# Patient Record
Sex: FEMALE | Race: WHITE
[De-identification: names, ages, dates, MRNs, and addresses within clinical notes are randomized per-mention and may not be internally consistent; named-entity substitution may affect disease eponyms.]

---

## 2020-09-17 ENCOUNTER — HOSPITAL ENCOUNTER (EMERGENCY)
Dept: HOSPITAL 41 - JD.ED | Age: 30
LOS: 1 days | Discharge: HOME | End: 2020-09-18
Payer: MEDICAID

## 2020-09-17 DIAGNOSIS — Z3A.14: ICD-10-CM

## 2020-09-17 DIAGNOSIS — O23.592: Primary | ICD-10-CM

## 2020-09-17 DIAGNOSIS — B96.89: ICD-10-CM

## 2020-09-17 NOTE — EDM.PDOC
ED HPI GENERAL MEDICAL PROBLEM





- General


Chief Complaint: OB/GYN Problem


Stated Complaint: 16 WKS PG LEAKING CLEAR FLUID


Time Seen by Provider: 20 23:27


Source of Information: Reports: Patient


History Limitations: Reports: No Limitations





- History of Present Illness


INITIAL COMMENTS - FREE TEXT/NARRATIVE: 





The patient presents with a clear vaginal discharge.  The patient is  at 15

weeks gestation with a LNMP of what she estimates at .  She noticed some

discharge last night but thought nothing of it and then tonight when she was 

going to get intimate with her  she noticed more fluid.  It is clear and 

not bloody.  She has no pain.  She has no fever, chills, cough, congestion, 

runny nose, chest pain, shortness of breath, abdominal pain, nausea or vomiting.

 She has no dysuria or hematuria.  She has no prenatal care yet.  She was 

waiting for her insurance.


Onset: Gradual


Duration: Day(s): (Yesterday)


Improves with: Reports: None


Worsens with: Reports: None


Associated Symptoms: Reports: No Other Symptoms





- Related Data


                                    Allergies











Allergy/AdvReac Type Severity Reaction Status Date / Time


 


No Known Allergies Allergy   Verified 20 23:15











Home Meds: 


                                    Home Meds





metroNIDAZOLE [Flagyl] 500 mg PO Q12H #14 tab 20 [Rx]











Past Medical History


OB/GYN History: Reports: Pregnancy





Social & Family History





- Tobacco Use


Smoking Status *Q: Never Smoker





ED ROS GENERAL





- Review of Systems


Review Of Systems: See Below


Constitutional: Reports: No Symptoms


HEENT: Reports: No Symptoms


Respiratory: Reports: No Symptoms


Cardiovascular: Reports: No Symptoms


Endocrine: Reports: No Symptoms


GI/Abdominal: Reports: No Symptoms


: Reports: Discharge





ED EXAM PREGNANCY





- Physical Exam


Exam: See Below


Exam Limited By: No Limitations


General Appearance: Alert, No Apparent Distress


Ears: Normal External Exam


Nose: Normal Inspection


Head: Atraumatic, Normocephalic


Neck: Normal Inspection


Respiratory/Chest: No Respiratory Distress, Lungs Clear, Normal Breath Sounds


Cardiovascular: Regular Rate, Rhythm, No Edema, No Murmur


GI/Abdominal Exam: Soft, Non-Tender, No Organomegaly, No Mass


Back Exam: Normal Inspection


Extremities: Normal Inspection





Course





- Vital Signs


Last Recorded V/S: 


                                Last Vital Signs











Temp  98.4 F   20 23:10


 


Pulse  89   20 23:10


 


Resp  16   20 23:10


 


BP  125/82   20 23:10


 


Pulse Ox  100   20 23:10














- Orders/Labs/Meds


Orders: 


                               Active Orders 24 hr











 Category Date Time Status


 


 Pelvic Exam, Set Up [RC] ASDIRECTED Care  20 23:58 Active


 


 OB Ltd 1 or More Fetus [US] Stat Exams  20 23:37 Taken


 


 GC/CHLAMYDIA BY PCR [MOLEC] Routine Lab  20 01:42 Received


 


 PATIENT RETYPE [BBK] Routine Lab  20 00:35 Ordered











Labs: 


                                Laboratory Tests











  20 Range/Units





  23:47 23:47 23:47 


 


WBC  10.89 H    (3.98-10.04)  K/mm3


 


RBC  4.17    (3.98-5.22)  M/mm3


 


Hgb  12.7    (11.2-15.7)  gm/dl


 


Hct  38.8    (34.1-44.9)  %


 


MCV  93.0    (79.4-94.8)  fl


 


MCH  30.5    (25.6-32.2)  pg


 


MCHC  32.7    (32.2-35.5)  g/dl


 


RDW Std Deviation  44.1    (36.4-46.3)  fL


 


Plt Count  186    (182-369)  K/mm3


 


MPV  9.6    (9.4-12.3)  fl


 


Neut % (Auto)  72.0 H    (34.0-71.1)  %


 


Lymph % (Auto)  19.7    (19.3-51.7)  %


 


Mono % (Auto)  6.7    (4.7-12.5)  %


 


Eos % (Auto)  1.2    (0.7-5.8)  


 


Baso % (Auto)  0.1    (0.1-1.2)  %


 


Neut # (Auto)  7.84 H    (1.56-6.13)  K/mm3


 


Lymph # (Auto)  2.15    (1.18-3.74)  K/mm3


 


Mono # (Auto)  0.73 H    (0.24-0.36)  K/mm3


 


Eos # (Auto)  0.13    (0.04-0.36)  K/mm3


 


Baso # (Auto)  0.01    (0.01-0.08)  K/mm3


 


HCG, Quant   87316.0   mIU/mL


 


Fetal Membrane Rupture     


 


Blood Type    O POSITIVE  














  20 Range/Units





  01:35 


 


WBC   (3.98-10.04)  K/mm3


 


RBC   (3.98-5.22)  M/mm3


 


Hgb   (11.2-15.7)  gm/dl


 


Hct   (34.1-44.9)  %


 


MCV   (79.4-94.8)  fl


 


MCH   (25.6-32.2)  pg


 


MCHC   (32.2-35.5)  g/dl


 


RDW Std Deviation   (36.4-46.3)  fL


 


Plt Count   (182-369)  K/mm3


 


MPV   (9.4-12.3)  fl


 


Neut % (Auto)   (34.0-71.1)  %


 


Lymph % (Auto)   (19.3-51.7)  %


 


Mono % (Auto)   (4.7-12.5)  %


 


Eos % (Auto)   (0.7-5.8)  


 


Baso % (Auto)   (0.1-1.2)  %


 


Neut # (Auto)   (1.56-6.13)  K/mm3


 


Lymph # (Auto)   (1.18-3.74)  K/mm3


 


Mono # (Auto)   (0.24-0.36)  K/mm3


 


Eos # (Auto)   (0.04-0.36)  K/mm3


 


Baso # (Auto)   (0.01-0.08)  K/mm3


 


HCG, Quant   mIU/mL


 


Fetal Membrane Rupture  Negative  


 


Blood Type   














- Re-Assessments/Exams


Free Text/Narrative Re-Assessment/Exam: 





20 23:55


I have ordered labs and UA and I will do an amnisure test to check for amniotic 

fluid.


20 01:36


Her WBC was slightly elevated at 10.89.  Her Hcg is elevated at 34,281.  Her 

blood type is O positive.  The US shows single live intrauterine gestation with 

sonographic gestational age of 14 weeks 2 days.  Amniotic fluid is within normal

 limits.  FHT is 160.





I did a speculum exam and there was some clear drainage.  I did an amniotic 

fluid test and that was negative.  Her Wet prep did show some clue cells.  I 

will get her on some flagyl for that.  I will have her follow up with OB.





Departure





- Departure


Time of Disposition: 02:10


Disposition: Home, Self-Care 01


Condition: Good


Clinical Impression: 


 Bacterial vaginosis in pregnancy





Pregnancy


Qualifiers:


 Weeks of gestation: 14 weeks Qualified Code(s): Z3A.14 - 14 weeks gestation of 

pregnancy








- Discharge Information


*PRESCRIPTION DRUG MONITORING PROGRAM REVIEWED*: Not Applicable


*COPY OF PRESCRIPTION DRUG MONITORING REPORT IN PATIENT [place]: Not Applicable


Prescriptions: 


metroNIDAZOLE [Flagyl] 500 mg PO Q12H #14 tab


Referrals: 


PCP,None [Primary Care Provider] - 


Forms:  ED Department Discharge


Additional Instructions: 


Take the flagyl 2 times per day for 7 days.  Follow up with your OB doctor as 

scheduled.  Please return if you are worse.





Sepsis Event Note (ED)





- Evaluation


Sepsis Screening Result: No Definite Risk





- Focused Exam


Vital Signs: 


                                   Vital Signs











  Temp Pulse Resp BP Pulse Ox


 


 20 23:10  98.4 F  89  16  125/82  100














- My Orders


Last 24 Hours: 


My Active Orders





20 23:37


OB Ltd 1 or More Fetus [US] Stat 





20 23:58


Pelvic Exam, Set Up [RC] ASDIRECTED 





20 00:35


PATIENT RETYPE [BBK] Routine 





20 01:42


GC/CHLAMYDIA BY PCR [MOLEC] Routine 














- Assessment/Plan


Last 24 Hours: 


My Active Orders





20 23:37


OB Ltd 1 or More Fetus [US] Stat 





20 23:58


Pelvic Exam, Set Up [RC] ASDIRECTED 





20 00:35


PATIENT RETYPE [BBK] Routine 





20 01:42


GC/CHLAMYDIA BY PCR [MOLEC] Routine

## 2020-09-18 LAB
C TRACH DNA SPEC QL NAA+PROBE: NOT DETECTED
N GONORRHOEA DNA SPEC QL NAA+PROBE: NOT DETECTED

## 2020-09-18 NOTE — US
Obstetrical ultrasound: Multiple real-time images were obtained 

transabdominally.

 

Comparison: No previous obstetrical imaging is available.

 

Dates:

Current ultrasound: [facility] 03/17/21, gestational age 14 weeks 2 days

 

Fetal presentation: Cephalic

Placenta: Posterior with no findings of placenta previa, no findings 

of abruption are seen.

Amniotic fluid: CHAPO 9.1 cm

 

Cervix is closed with length of 4.2 cm.

 

Measurements:

BPD: 2.43 cm - 14 weeks 1 day

Head circumference: 9.81 cm - 14 weeks 4 days

Abdominal circumference: 7.57 cm - 14 weeks 1 day

Femur length: 1.39 cm - 14 weeks 1 day

Estimated fetal weight: 90 g (0 lbs. 3 oz.), estimated fetal weight 

24th percentile for age by current ultrasound

Heart rate: 155 bpm

 

Impression:

1.  Single intrauterine fetus currently cephalic in presentation.  

Dates as noted above.

2.  Normal CHAPO is noted.  No complicating process is seen by 

ultrasound exam.

 

Diagnostic code #1

 

This report was dictated in MDT

 

I agree with preliminary report from jose roberto, finalized on 09/18/20, 2:18

 AM Central Daylight Time

## 2021-01-31 ENCOUNTER — HOSPITAL ENCOUNTER (EMERGENCY)
Dept: HOSPITAL 41 - JD.ED | Age: 31
Discharge: HOME | End: 2021-01-31
Payer: MEDICAID

## 2021-01-31 DIAGNOSIS — Z3A.34: ICD-10-CM

## 2021-01-31 DIAGNOSIS — J01.90: ICD-10-CM

## 2021-01-31 DIAGNOSIS — Z20.822: ICD-10-CM

## 2021-01-31 DIAGNOSIS — O99.513: Primary | ICD-10-CM

## 2021-01-31 PROCEDURE — 99283 EMERGENCY DEPT VISIT LOW MDM: CPT

## 2021-01-31 PROCEDURE — 87635 SARS-COV-2 COVID-19 AMP PRB: CPT

## 2021-01-31 PROCEDURE — U0002 COVID-19 LAB TEST NON-CDC: HCPCS

## 2021-01-31 NOTE — EDM.PDOC
ED HPI GENERAL MEDICAL PROBLEM





- General


Chief Complaint: Respiratory Problem


Stated Complaint: CONGESTION/SORE THROAT/COUGH


Time Seen by Provider: 21 15:47


Source of Information: Reports: Patient, Family (spouse)


History Limitations: Reports: No Limitations





- History of Present Illness


INITIAL COMMENTS - FREE TEXT/NARRATIVE: 


30-year-old female presents to the ED for evaluation of persistent sinus 

congestion sore throat and paroxysmal intermittent cough productive of 

occasional yellow sputum.  Of note she is 34 weeks gestation.  She states nasal 

congestion symptoms have been present for the last 2 weeks and she thought were 

getting better a few days ago but it is returned with a vengeance over the last 

36 hours.  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  

She is tested either biweekly or weekly now for COVID-19 illness with the last 

test being done  and was negative.  Concern today is for COVID-19 

illness and ability to return to work.  She does not believe she is running a 

fever.  Decreased appetite however no change in smell or taste.  No diarrhea.





Onset: Gradual


Onset Date: 01/15/21


Duration: Day(s):, Constant, Getting Worse


Location: Reports: Face (Nasal congestion sinus congestion), Chest ( postnasal 

drip with paroxysmal cough)


Quality: Reports: Pressure, Other


Severity: Moderate


Improves with: Reports: None


Worsens with: Reports: None


Context: Denies: Activity, Exercise, Lifting, Sick Contact, Trauma, Other


Associated Symptoms: Reports: Cough, cough w sputum, Loss of Appetite, Malaise 

(Yellow sputum.).  Denies: Confusion, Chest Pain, Diaphoresis, Fever/Chills, 

Headaches, Nausea/Vomiting, Rash, Seizure, Shortness of Breath, Syncope, 

Weakness


Treatments PTA: Reports: Acetaminophen


  ** Throat


Pain Score (Numeric/FACES): 10





- Related Data


                                    Allergies











Allergy/AdvReac Type Severity Reaction Status Date / Time


 


No Known Allergies Allergy   Verified 20 23:15











Home Meds: 


                                    Home Meds





Cefdinir [Omnicef] 300 mg PO BID #18 cap 21 [Rx]


Pnv No.95/Ferrous Fum/Folic AC [Prenatal Multivitamin Tablet] 1 tab PO DAILY 

21 [History]











Past Medical History


OB/GYN History: Reports: Pregnancy


: 2


Para: 1





- Past Surgical History


Female  Surgical History: Reports:  Section





Social & Family History





- Tobacco Use


Tobacco Use Status *Q: Never Tobacco User





- Caffeine Use


Caffeine Use: Reports: Coffee





- Recreational Drug Use


Recreational Drug Use: No





- Living Situation & Occupation


Living situation: Reports: 


Occupation: Employed





ED ROS GENERAL





- Review of Systems


Review Of Systems: See Below


Constitutional: Reports: Malaise, Weakness, Fatigue, Decreased Appetite.  

Denies: Fever, Chills, Weight Loss


HEENT: Reports: Sinus Problem, Throat Pain (The last 2 weeks significant sinus 

congestion with postnasal drip)


Respiratory: Reports: Shortness of Breath, Cough, Sputum (Yellow sputum).  

Denies: Wheezing, Pleuritic Chest Pain


Cardiovascular: Reports: No Symptoms


Endocrine: Reports: Fatigue


GI/Abdominal: Reports: Decreased Appetite.  Denies: Constipation, Diarrhea


: Reports: No Symptoms


Musculoskeletal: Reports: No Symptoms


Skin: Reports: No Symptoms


Neurological: Reports: No Symptoms


Psychiatric: Reports: No Symptoms


Hematologic/Lymphatic: Reports: No Symptoms


Immunologic: Reports: No Symptoms





ED EXAM, GENERAL





- Physical Exam


Exam: See Below


Exam Limited By: No Limitations


General Appearance: Alert, WD/WN, No Apparent Distress, Other (Temperature is 

37.4 degrees with a heart rate of 99 in sinus respiratory to 20 with O2 sats of 

97% room air BP 1984.)


Eye Exam: Bilateral Eye: Normal Inspection (No scleral icterus or blepharal 

pallor.), PERRL


Ears: Normal TMs


Nose: Nasal Swelling (Significant swelling of both the superior and mid 

turbinates bilaterally with slight yellow-greenish discharge).  No: Clear 

Rhinorrhea


Throat/Mouth: Normal Lips, Inflammation (Diffuse inflammation of the posterior 

oropharynx)


Head: Atraumatic, Normocephalic


Neck: Normal Inspection, Supple, Non-Tender, Full Range of Motion.  No: 

Lymphadenopathy (L), Lymphadenopathy (R)


Respiratory/Chest: No Respiratory Distress, Lungs Clear, Normal Breath Sounds, 

No Accessory Muscle Use


Cardiovascular: Normal Peripheral Pulses, Regular Rate, Rhythm, No Edema, No 

Gallop, No Murmur, No Rub


Peripheral Pulses: 3+: Carotid (L), Carotid (R), Posterior Tibial (L), Posterior

 Tibial (R), Dorsalis Pedis (L), Dorsalis Pedis (R)


GI/Abdominal: Normal Bowel Sounds, Soft, Non-Tender, No Organomegaly, Pelvis 

Stable, Other (Correlates with 34 weeks gestation)


Back Exam: Normal Inspection, Full Range of Motion.  No: CVA Tenderness (L), CVA

 Tenderness (R)


Extremities: Normal Inspection, Normal Range of Motion, Non-Tender, No Pedal 

Edema


Neurological: Alert, Oriented, CN II-XII Intact, Normal Cognition


Psychiatric: Normal Affect, Normal Mood


Skin Exam: Warm, Dry, Intact, Normal Color, No Rash





Course





- Vital Signs


Last Recorded V/S: 


                                Last Vital Signs











Temp  37.4 C   21 15:20


 


Pulse  99   21 15:20


 


Resp  20   21 15:20


 


BP  125/84   21 15:20


 


Pulse Ox  97   21 15:20














- Orders/Labs/Meds


Labs: 


                                Laboratory Tests











  21 Range/Units





  16:05 


 


SARS-CoV-2 RNA (SABRINA)  Negative  (NEGATIVE)  











Meds: 


Medications














Discontinued Medications














Generic Name Dose Route Start Last Admin





  Trade Name Freq  PRN Reason Stop Dose Admin


 


Cefdinir  300 mg  21 16:00  21 16:14





  Omnicef  PO  21 16:01  300 mg





  ONETIME ONE   Administration














- Radiology Interpretation


Free Text/Narrative:: 


30-year-old female attends the ED due to persistent sinus congestion with 

postnasal drip and paroxysmal cough productive of slight yellowish sputum.  She 

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  She is Covid tested usually 

once weekly with the last test being - .  Concern is that she felt 

better for a few days and then symptoms came back again worse than ever.  She 

finds herself coughing a good portion of the night.  Of note patient is 34 weeks

 pregnant  2 para 1.  Pressure under her eyes with nasal congestion and 

sore throat.  Examination reveals no serous otitis media.  Nasal congestion 

apparent with enlargement of the turbinates both sides marked pharyngeal 

erythema without exudate tenderness throughout the submandibular glands 

bilaterally slightly worse on the left as compared to the right.  No anterior 

posterior chain adenopathy.  Lungs were clear to auscultation.  Without any 

wheezing.  Plan COVID-19 screen will be repeated since it is important that she 

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  I suspect she 

has straightforward sinusitis with postnasal drip causing cough.  Plan will be 

to place her on Omnicef 300 mg twice daily for the next 9 days.  First tablet 

was provided through the ED since the drugstores are closed at this time.  

Patient is using Mucinex every 12 hours as well which she may continue.  Note 

given to excuse her from the workplace today.








- Re-Assessments/Exams


Free Text/Narrative Re-Assessment/Exam: 





21 18:10 I did phone the patient and advised her that her COVID-19 screen 

is negative.  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

 was important for her to know this so as not to infect any of the coworkers or 

inpatients.











Departure





- Departure


Time of Disposition: 16:36


Disposition: Home, Self-Care 01


Condition: Fair


Clinical Impression: 


 Third trimester pregnancy





Sinusitis


Qualifiers:


 Sinusitis location: unspecified location Chronicity: acute Recurrence: non-re

current Qualified Code(s): J01.90 - Acute sinusitis, unspecified








- Discharge Information


*PRESCRIPTION DRUG MONITORING PROGRAM REVIEWED*: Not Applicable


*COPY OF PRESCRIPTION DRUG MONITORING REPORT IN PATIENT [place]: Not Applicable


Prescriptions: 


Cefdinir [Omnicef] 300 mg PO BID #18 cap


Instructions:  Sinusitis, Adult, Easy-to-Read


Referrals: 


PCP,None [Primary Care Provider] - 


Forms:  ED Department Discharge, ED Return to Work/School Form


Additional Instructions: 


Evaluation in the emergency room today in regards to persistent nasal congestion

sore throat and paroxysmal cough.  Symptoms have been present for over 2 weeks. 

Examination reveals significant erythema of the throat with normal tonsils.  

Mildly enlarged tender lymph nodes under the mandible.  Clear lung fields with 

no wheezing.  History suggest bronchitis and sinusitis.  Likely postnasal drip 

from sinuses causing the paroxysmal cough.  Due to pregnancy and x-ray is 

contraindicated.  Suggest treatment to be continued use of Mucinex as needed.  

Antibiotic to be Omnicef 300 mg twice daily for the next 9 days.  First tablets 

were provided through the emergency room today since the drugstores are closed 

at this time.  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

and I will call you later with the results once they become available usually 

about an hour and 1/2 to 2 hours.





Sepsis Event Note (ED)





- Evaluation


Sepsis Screening Result: No Definite Risk

## 2021-03-09 ENCOUNTER — HOSPITAL ENCOUNTER (INPATIENT)
Dept: HOSPITAL 41 - JD.OB | Age: 31
LOS: 2 days | Discharge: HOME | End: 2021-03-11
Attending: OBSTETRICS & GYNECOLOGY | Admitting: OBSTETRICS & GYNECOLOGY
Payer: MEDICAID

## 2021-03-09 DIAGNOSIS — Z20.822: ICD-10-CM

## 2021-03-09 DIAGNOSIS — Z3A.39: ICD-10-CM

## 2021-03-09 DIAGNOSIS — O34.211: Primary | ICD-10-CM

## 2021-03-09 DIAGNOSIS — Z23: ICD-10-CM

## 2021-03-09 PROCEDURE — G0008 ADMIN INFLUENZA VIRUS VAC: HCPCS

## 2021-03-09 PROCEDURE — U0002 COVID-19 LAB TEST NON-CDC: HCPCS

## 2021-03-09 RX ADMIN — KETOROLAC TROMETHAMINE SCH MG: 30 INJECTION, SOLUTION INTRAMUSCULAR at 21:16

## 2021-03-09 RX ADMIN — KETOROLAC TROMETHAMINE SCH MG: 30 INJECTION, SOLUTION INTRAMUSCULAR at 15:43

## 2021-03-09 NOTE — PCM.POSTAN
POST ANESTHESIA ASSESSMENT





- MENTAL STATUS


Mental Status: Alert, Oriented





- VITAL SIGNS


Vital Signs: 


                                Last Vital Signs











Temp  97.5 F   03/09/21 08:32


 


Pulse  74   03/09/21 08:32


 


Resp  9 L  03/09/21 08:32


 


BP  83/47 L  03/09/21 08:32


 


Pulse Ox  96   03/09/21 08:32














- RESPIRATORY


Respiratory Status: Respiratory Rate WNL, Airway Patent, O2 Saturation Stable





- CARDIOVASCULAR


CV Status: Pulse Rate WNL, Low Blood Pressure





- GASTROINTESTINAL


GI Status: No Symptoms





- PAIN


Pain Score: 0 (post SAB)





- POST OP HYDRATION


Hydration Status: Adequate & Stable

## 2021-03-09 NOTE — PCM.PREANE
Preanesthetic Assessment





- Procedure


Proposed Procedure: 


Repeat  section








- Anesthesia/Transfusion/Family Hx


Anesthesia History: Prior Anesthesia Without Reaction


Transfusion History: No Prior Transfusion(s)





- Review of Systems


General: No Symptoms


Pulmonary: Cough, Other (nasal drainage)


Cardiovascular: No Symptoms


Gastrointestinal: No Symptoms


Neurological: No Symptoms


Other: Reports: None





- Physical Assessment


NPO Status Date: 21


NPO Status Time: 22:00


Vital Signs: 





                                Last Vital Signs











Temp  97.6 F   21 06:35


 


Pulse  82   21 06:35


 


Resp  14   21 06:35


 


BP  120/91 H  21 06:35


 


Pulse Ox  96   21 06:35











Height: 1.57 m


Weight: 101.605 kg


ASA Class: 2


Mental Status: Alert & Oriented x3


Airway Class: Mallampati = 2


Dentition: Reports: Normal Dentition


Thyro-Mental Finger Breadths: 3


Mouth Opening Finger Breadths: 3


ROM/Head Extension: Full


Lungs: Clear to Auscultation, Normal Respiratory Effort


Cardiovascular: Regular Rate, Regular Rhythm





- Lab


Values: 





                             Laboratory Last Values











WBC  11.86 K/mm3 (3.98-10.04)  H  21  05:48    


 


RBC  4.17 M/mm3 (3.98-5.22)   21  05:48    


 


Hgb  11.7 gm/dl (11.2-15.7)  D 21  05:48    


 


Hct  37.5 % (34.1-44.9)   21  05:48    


 


MCV  89.9 fl (79.4-94.8)  D 21  05:48    


 


MCH  28.1 pg (25.6-32.2)   21  05:48    


 


MCHC  31.2 g/dl (32.2-35.5)  L  21  05:48    


 


RDW Std Deviation  49.0 fL (36.4-46.3)  H  21  05:48    


 


Plt Count  216 K/mm3 (182-369)   21  05:48    


 


MPV  11.0 fl (9.4-12.3)   21  05:48    


 


Neut % (Auto)  69.4 % (34.0-71.1)   21  05:48    


 


Lymph % (Auto)  19.9 % (19.3-51.7)   21  05:48    


 


Mono % (Auto)  8.8 % (4.7-12.5)   21  05:48    


 


Eos % (Auto)  1.3  (0.7-5.8)   21  05:48    


 


Baso % (Auto)  0.3 % (0.1-1.2)   21  05:48    


 


Neut # (Auto)  8.25 K/mm3 (1.56-6.13)  H  21  05:48    


 


Lymph # (Auto)  2.36 K/mm3 (1.18-3.74)   21  05:48    


 


Mono # (Auto)  1.04 K/mm3 (0.24-0.36)  H  21  05:48    


 


Eos # (Auto)  0.15 K/mm3 (0.04-0.36)   21  05:48    


 


Baso # (Auto)  0.03 K/mm3 (0.01-0.08)   21  05:48    


 


Urine Opiates Screen  Negative  (CHOQQR=773)   21  05:25    


 


Ur Buprenorphine Scrn  Negative  (CUTOFF=10)   21  05:25    


 


Ur Oxycodone Screen  Negative  (RFS2EA=010)   21  05:25    


 


Urine Methadone Screen  Negative  (RIUIDA=902)   21  05:25    


 


Ur Propoxyphene Screen  Negative  (VZMJHL=676)   21  05:25    


 


Ur Barbiturates Screen  Negative  (OXDBOT=331)   21  05:25    


 


Ur Tricyclics Screen  Negative  (IDIQWT=472)   21  05:25    


 


Ur Phencyclidine Scrn  Negative  (CUTOFF=25)   21  05:25    


 


Ur Amphetamine Screen  Negative  (AWOPZJ=569)   21  05:25    


 


U Methamphetamines Scrn  Negative  (ZPYROU=077)   21  05:25    


 


U Benzodiazepines Scrn  Negative  (LCMJFL=538)   21  05:25    


 


U Cocaine Metab Screen  Negative  (TVGMEO=767)   21  05:25    


 


U Marijuana (THC) Screen  Negative  (CUTOFF=50)   21  05:25    


 


SARS-CoV-2 RNA (SABRINA)  Negative  (NEGATIVE)   21  05:25    














- Allergies


Allergies/Adverse Reactions: 


                                    Allergies











Allergy/AdvReac Type Severity Reaction Status Date / Time


 


No Known Allergies Allergy   Verified 20 23:15














- Acknowledgements


Anesthesia Type Planned: Spinal


Pt an Appropriate Candidate for the Planned Anesthesia: Yes


Alternatives and Risks of Anesthesia Discussed w Pt/Guardian: Yes


Pt/Guardian Understands and Agrees with Anesthesia Plan: Yes





PreAnesthesia Questionnaire





- Past Health History


Medical/Surgical History: Denies Medical/Surgical History


Gastrointestinal History: Reports: GERD


OB/GYN History: Reports: Pregnancy


Psychiatric History: Reports: Depression, Other (See Below)


Other Psychiatric History: PPD


Endocrine/Metabolic History: Reports: Diabetes, Gestational





- Past Surgical History


Female  Surgical History: Reports:  Section


Musculoskeletal Surgical History: Reports: Other (See Below) (cyst removal from 

leg)





- SUBSTANCE USE


Tobacco Use Status *Q: Never Tobacco User


Second Hand Smoke Exposure: No


Recreational Drug Use History: Yes


Recreational Drug Type: Reports: Marijuana/Hashish





- HOME MEDS


Home Medications: 


                                    Home Meds





Cefdinir [Omnicef] 300 mg PO BID #18 cap 21 [Rx]


Pnv No.95/Ferrous Fum/Folic AC [Prenatal Multivitamin Tablet] 1 tab PO DAILY 

21 [History]











- CURRENT (IN HOUSE) MEDS


Current Meds: 





                               Current Medications





Citric Acid/Sodium Citrate (Bicitra Solution)  30 ml PO ONETIME ONE


   Stop: 21 06:31


Cefazolin Sodium/Dextrose 2 gm (/ Premix)  50 mls @ 100 mls/hr IV ONETIME ONE


   Stop: 21 07:29


Lactated Ringer's (Ringers, Lactated)  1,000 mls @ 125 mls/hr IV ASDIRECTED PANCHO


Metoclopramide HCl (Reglan)  10 mg IVPUSH ONETIME ONE


   Stop: 21 06:31


Sodium Chloride (Saline Flush)  10 ml FLUSH ASDIRECTED PRN


   PRN Reason: Keep Vein Open

## 2021-03-09 NOTE — PCM.OPNOTE
- General Post-Op/Procedure Note


Date of Surgery/Procedure: 21


Operative Procedure(s): repeat  section


Findings: 





viable female, weight 7#12 oz, 8/9 APGARS at 0801. 


Pre Op Diagnosis: prior , desires repeat


Post-Op Diagnosis: Same


Anesthesia Technique: Spinal


Primary Surgeon: Allyson Romero


Anesthesia Provider: Emerson Palmer


Assistant: Claudia George


Assistant: Mary Lou Ho


Fluid Replacement, Intraop: 1,400


Output, Urine Amount: 100


EBL in mLs: 800


Complications: None


Condition: Good


Free Text/Narrative:: 


The patient was taken to the operating room where spinal anesthesia was dosed to

surgical levels without difficulty. The patient was prepped and draped in the 

usual sterile fashion in the dorsal supine position with a leftward tilt. A 

Pfannenstiel skin incision was made with the scalpel and carried through to the 

underlying layer of fascia. The fascia was incised in the midline and extended 

laterally using Shabazz scissors. Kocher clamps were used to elevate the superior 

aspect of the fascial incision, which was elevated, and the underlying rectus 

muscles were dissected off bluntly and using Shabazz scissors. Attention was then 

turned to the inferior aspect of the fascial incision, which in similar fashion 

was grasped with Kocher clamps, elevated, and the underlying rectus muscles were

dissected off bluntly and using the shabazz. The rectus muscles were dissected in 

the midline.





The peritoneum was entered bluntly; this incision was extended superiorly and 

inferiorly with good visualization of the bladder. The bladder blade was 

inserted. The vesicouterine peritoneum was identified and entered sharply using 

Metzenbaum scissors. This incision was extended laterally and the bladder flap 

was created digitally. The bladder blade was reinserted. The lower uterine 

segment was incised in a transverse fashion using the scalpel and with digital 

traction.





Clear fluid was noted. The infant was subsequently delivered by flexing the head

to the incision. Body and shoulders followed without difficulty.  The cord was 

clamped and cut. The infant was subsequently handed to the awaiting pediatrician

whose presence had been requested.. The placenta was delivered spontaneously 

intact with a three-vessel cord noted. The uterus was exteriorized and cleared 

of all clots and debris. The uterine incision was repaired in 1 layers using 0 

monocryl. Hemostasis was visualized.  Hemostasis was visualized bilaterally. The

uterus was returned to the abdomen. One small area of bleeding was noted and a 

figure of eight utilized to render that hemostatic. The uterine incision was 

reexamined and it was noted to be hemostatic. The pelvis was copiously 

irrigated. The fascia was closed with 1 PDS suture,  and the skin was closed 

with 3-0 monocryl. Sponge, lap, and instrument counts were correct x2. The 

patient was stable at the completion of the procedure and was subsequently 

transferred to the recovery room in stable condition.

## 2021-03-10 PROCEDURE — 3E02340 INTRODUCTION OF INFLUENZA VACCINE INTO MUSCLE, PERCUTANEOUS APPROACH: ICD-10-PCS | Performed by: OBSTETRICS & GYNECOLOGY

## 2021-03-10 RX ADMIN — OXYCODONE HYDROCHLORIDE AND ACETAMINOPHEN PRN TAB: 5; 325 TABLET ORAL at 20:11

## 2021-03-10 RX ADMIN — OXYCODONE HYDROCHLORIDE AND ACETAMINOPHEN PRN TAB: 5; 325 TABLET ORAL at 15:53

## 2021-03-10 RX ADMIN — KETOROLAC TROMETHAMINE SCH MG: 30 INJECTION, SOLUTION INTRAMUSCULAR at 03:50

## 2021-03-10 NOTE — PCM.SN.2
- Free Text/Narrative


Note: 





Post Operative Progress Note


POD #1





Subjective:


Doing well overall.  Ambulating minimally in the room without difficulty.  

Lochia minimal.  Patient had Chavira catheter removed earlier this morning and has

been able to urinate without difficulty.  Passing flatus.  Tolerating regular 

diet without nausea or vomiting.  Pain controlled with oral medications.  

Breast-feeding with minimal difficulty.





Objective: 


Vitals:


                               Vital Signs - 8 hr











  03/10/21 03/10/21 03/10/21





  01:16 02:00 03:00


 


Temperature 36.5 C  


 


Pulse, 83  





Peripheral   


 


Respiratory 14 12 14





Rate   


 


Blood Pressure 122/77  


 


O2 Sat by Pulse 97 97 98





Oximetry   














  03/10/21 03/10/21 03/10/21





  03:55 05:00 06:00


 


Temperature 36.7 C  


 


Pulse, 75  





Peripheral   


 


Respiratory 15 14 15





Rate   


 


Blood Pressure 120/64  


 


O2 Sat by Pulse 98 99 99





Oximetry   











Physical Exam


General: Alert and oriented, no acute distress


Lungs: Clear to auscultation bilaterally


Heart: Regular rate and rhythm


Abdomen: Soft, minimal appropriate tenderness, non-distended, fundus midline, 

nontender and at the umbilicus


Incision: Clean, dry and intact, no erythema, bleeding or drainage with Steri-

Strips in place


Extremities: No edema





Labs: 


 


                                Laboratory Tests











  21 Range/Units





  05:25 05:25 05:48 


 


WBC     (3.98-10.04)  K/mm3


 


RBC     (3.98-5.22)  M/mm3


 


Hgb     (11.2-15.7)  gm/dl


 


Hct     (34.1-44.9)  %


 


MCV     (79.4-94.8)  fl


 


MCH     (25.6-32.2)  pg


 


MCHC     (32.2-35.5)  g/dl


 


RDW Std Deviation     (36.4-46.3)  fL


 


Plt Count     (182-369)  K/mm3


 


MPV     (9.4-12.3)  fl


 


Neut % (Auto)     (34.0-71.1)  %


 


Lymph % (Auto)     (19.3-51.7)  %


 


Mono % (Auto)     (4.7-12.5)  %


 


Eos % (Auto)     (0.7-5.8)  


 


Baso % (Auto)     (0.1-1.2)  %


 


Neut # (Auto)     (1.56-6.13)  K/mm3


 


Lymph # (Auto)     (1.18-3.74)  K/mm3


 


Mono # (Auto)     (0.24-0.36)  K/mm3


 


Eos # (Auto)     (0.04-0.36)  K/mm3


 


Baso # (Auto)     (0.01-0.08)  K/mm3


 


Urine Opiates Screen  Negative    (DTOMLA=847)  


 


Ur Buprenorphine Scrn  Negative    (CUTOFF=10)  


 


Ur Oxycodone Screen  Negative    (SMT6SL=389)  


 


Urine Methadone Screen  Negative    (SITVLL=785)  


 


Ur Propoxyphene Screen  Negative    (YMRWDI=376)  


 


Ur Barbiturates Screen  Negative    (FRKDSZ=108)  


 


Ur Tricyclics Screen  Negative    (YTXYME=491)  


 


Ur Phencyclidine Scrn  Negative    (CUTOFF=25)  


 


Ur Amphetamine Screen  Negative    (WQXKKZ=255)  


 


U Methamphetamines Scrn  Negative    (BFPYRZ=203)  


 


U Benzodiazepines Scrn  Negative    (UPLUKJ=357)  


 


U Cocaine Metab Screen  Negative    (MRCKWN=089)  


 


U Marijuana (THC) Screen  Negative    (CUTOFF=50)  


 


SARS-CoV-2 RNA (SABRINA)   Negative   (NEGATIVE)  


 


Blood Type    O POSITIVE  


 


Gel Antibody Screen    Negative  














  03/09/21 03/10/21 Range/Units





  05:48 04:48 


 


WBC  11.86 H  10.71 H  (3.98-10.04)  K/mm3


 


RBC  4.17  3.27 L  (3.98-5.22)  M/mm3


 


Hgb  11.7  D  9.1 L D  (11.2-15.7)  gm/dl


 


Hct  37.5  29.9 L  (34.1-44.9)  %


 


MCV  89.9  D  91.4  (79.4-94.8)  fl


 


MCH  28.1  27.8  (25.6-32.2)  pg


 


MCHC  31.2 L  30.4 L  (32.2-35.5)  g/dl


 


RDW Std Deviation  49.0 H  49.0 H  (36.4-46.3)  fL


 


Plt Count  216  161 L  (182-369)  K/mm3


 


MPV  11.0  10.9  (9.4-12.3)  fl


 


Neut % (Auto)  69.4  71.3 H  (34.0-71.1)  %


 


Lymph % (Auto)  19.9  18.1 L  (19.3-51.7)  %


 


Mono % (Auto)  8.8  9.3  (4.7-12.5)  %


 


Eos % (Auto)  1.3  0.8  (0.7-5.8)  


 


Baso % (Auto)  0.3  0.2  (0.1-1.2)  %


 


Neut # (Auto)  8.25 H  7.63 H  (1.56-6.13)  K/mm3


 


Lymph # (Auto)  2.36  1.94  (1.18-3.74)  K/mm3


 


Mono # (Auto)  1.04 H  1.00 H  (0.24-0.36)  K/mm3


 


Eos # (Auto)  0.15  0.09  (0.04-0.36)  K/mm3


 


Baso # (Auto)  0.03  0.02  (0.01-0.08)  K/mm3


 


Urine Opiates Screen    (PKUPFN=032)  


 


Ur Buprenorphine Scrn    (CUTOFF=10)  


 


Ur Oxycodone Screen    (LQL1HT=698)  


 


Urine Methadone Screen    (MCKJYA=514)  


 


Ur Propoxyphene Screen    (GRPNOG=584)  


 


Ur Barbiturates Screen    (JOYDHU=491)  


 


Ur Tricyclics Screen    (FMSERF=313)  


 


Ur Phencyclidine Scrn    (CUTOFF=25)  


 


Ur Amphetamine Screen    (WRSKYI=603)  


 


U Methamphetamines Scrn    (NDRHTO=690)  


 


U Benzodiazepines Scrn    (TZVFFQ=920)  


 


U Cocaine Metab Screen    (RINERJ=979)  


 


U Marijuana (THC) Screen    (CUTOFF=50)  


 


SARS-CoV-2 RNA (SABRINA)    (NEGATIVE)  


 


Blood Type    


 


Gel Antibody Screen    














ASSESSMENT:


30-year-old female -0-0-2 s/p repeat  section POD #1 for history of

 section, complicated by GBS positive status and history of postpartum 

depression





PLAN: 


Doing well


Breast-feeding with minimal difficulty. Assist as needed


Incision healing well. Continue to keep clean and dry.


Lochia minimal. Continue to monitor for appropriate lochia.


Continue routine post-operative care


Anticipate discharge home tomorrow








Smith Donovan MD


8:55 AM


3/10/2021

## 2021-03-10 NOTE — [id number]
Post Anesthesia Note





- EVALUATION WITHIN 48HRS OF ANESTHETIC


Vital Signs in Normal Range: Yes


Patient Participated in Evaluation: Yes


Respiratory Function Stable: Yes


Airway Patent: Yes


Cardiovascular Function Stable: Yes


Hydration Status Stable: Yes


Pain Control Satisfactory: Yes


Nausea and Vomiting Control Satisfactory: Yes


Mental Status Recovered: Yes


Vital Signs: 


                                Last Vital Signs











Temp  36.9 C   03/10/21 07:50


 


Pulse  67   03/10/21 07:50


 


Resp  15   03/10/21 07:50


 


BP  117/85   03/10/21 07:50


 


Pulse Ox  98   03/10/21 07:50

## 2021-03-11 RX ADMIN — OXYCODONE HYDROCHLORIDE AND ACETAMINOPHEN PRN TAB: 5; 325 TABLET ORAL at 16:41

## 2021-03-11 RX ADMIN — OXYCODONE HYDROCHLORIDE AND ACETAMINOPHEN PRN TAB: 5; 325 TABLET ORAL at 03:30

## 2021-03-11 RX ADMIN — OXYCODONE HYDROCHLORIDE AND ACETAMINOPHEN PRN TAB: 5; 325 TABLET ORAL at 12:34

## 2021-03-11 NOTE — PCM.DCSUM1
**Discharge Summary





- Hospital Course


Free Text/Narrative:: 


Gayle is a 30-year-old multigravida female admitted on 3/9/2021 for elective 

repeat  section.  She is at term and has had a previous  section

with desire for repeat  section.  The procedure, risk, benefits were 

discussed with the patient per Dr. Rubio.  Please see admission history and 

physical for details concerning H&P.





Patient was taken to surgery on 3/9/2021.  Repeat  was done under 

spinal block.  The patient delivered a 7 pound 12 ounce female infant with 

Apgars of 8 and 9 at 0801 hrs. on 3/9/2021.





Postoperatively pain was controlled initially with Duramorph through spinal 

block followed by ibuprofen and Percocet.  She made good bowel, bladder and 

ambulatory recovery.  Vital signs remained relatively stable throughout the post

operative period.  At the time of discharge she is voiding well, ambulating wi

thout problems.  She is desiring discharge home.





Condition: Good


Diagnosis: Stroke: No





- Discharge Data


Discharge Date: 21


Discharge Disposition: Home, Self-Care 01


Condition: Good





- Referral to Home Health


Primary Care Physician: 


Allyson Romero MD








- Patient Summary/Data


Operative Procedure(s) Performed: repeat  section





- Patient Instructions


Diet: Regular Diet as Tolerated (Regular diet with increased calories and 

calcium as recommended)


Activity: As Tolerated (No lifting greater than 15 pounds or driving a car x1 

week.  No intercourse or tampons until bleeding resolves.)


Driving: Do Not Drive


Showering/Bathing: May Shower


Wound/Incision Care: Keep Operative Site/Wound Site Clean and Dry


Notify Provider of: Fever, Increased Pain, Swelling and Redness, Nausea and/or 

Vomiting





- Discharge Plan


Home Medications: 


                                    Home Meds





Pnv No.95/Ferrous Fum/Folic AC [Prenatal Multivitamin Tablet] 1 tab PO DAILY 

21 [History]


Acetaminophen/oxyCODONE [Percocet 325-5 MG] 2 tab PO Q4H PRN  tablet 21 

[Rx]


Ibuprofen [Motrin] 600 mg PO Q6H PRN  tablet 21 [Rx]








Referrals: 


Allyson Romero MD [Primary Care Provider] -  (Patient to call [facility] for postpartum appointment with Dr. Romero.)





- Discharge Summary/Plan Comment


DC Time >30 min.: No


Discharge Summary/Plan Comment: 








Discharge instructions:





1. Discharge home





2. Diet, activity and follow-up discussed with patient. Recommend nursing diet 

with increased calories and calcium.





3. Precautions given concern increased pain, bleeding, temperature, 

signs/symptoms of DVT/PE.





4. Medications per home medication was printed, discussed with and given to the 

patient.





5. [facility] call for a postpartum

appointment.





Diagnosis: 





1.  Term pregnancy-delivered 





2.  History of previous  section with desire for repeat  section





Condition: Good





- Patient Data


Vitals - Most Recent: 


                                Last Vital Signs











Temp  37.1 C   21 03:26


 


Pulse  83   21 03:26


 


Resp  15   21 03:26


 


BP  122/83   21 03:26


 


Pulse Ox  100   21 03:26











Weight - Most Recent: 101.605 kg


I&O - Last 24 hours: 


                                 Intake & Output











 03/10/21 03/10/21 03/11/21





 14:59 22:59 06:59


 


Intake Total 120  


 


Output Total 1300  


 


Balance -1180  











Lab Results - Last 24 hrs: 


                         Laboratory Results - last 24 hr











  21 Range/Units





  05:48 


 


RPR  Non-reactive  (NONREACTIVE)  











Med Orders - Current: 


                               Current Medications





Diphenhydramine HCl (Diphenhydramine 50 Mg/Ml Sdv)  25 mg IVPUSH Q6H PRN


   PRN Reason: Pruritis


Diphenhydramine HCl (Diphenhydramine 50 Mg/Ml Sdv)  25 mg IVPUSH Q6H PRN


   PRN Reason: Itching or Nausea


Ephedrine Sulfate (Ephedrine 50 Mg/Ml Sdv)  5 mg IVPUSH SEECOMMENT PRN


   PRN Reason: Other


Fentanyl (Fentanyl 100 Mcg/2 Ml Sdv)  50 mcg IVPUSH Q5M PRN


   PRN Reason: Pain


Dextrose/Lactated Ringer's (Dextrose 5%-Lactated Ringers)  1,000 mls @ 125 

mls/hr IV ASDIRECTED PANCHO


   Stop: 21 19:57


   Last Admin: 21 12:54 Dose:  125 mls/hr


   Documented by: 


Dextrose/Lactated Ringer's (Dextrose 5%-Lactated Ringers)  1,000 mls @ 125 

mls/hr IV ASDIRECTED [facility]


Ibuprofen (Ibuprofen 600 Mg Tab)  600 mg PO Q6H PRN


   PRN Reason: mild pain or fever


   Last Admin: 03/10/21 19:50 Dose:  600 mg


   Documented by: 


Ketorolac Tromethamine (Ketorolac 30 Mg/Ml Sdv)  30 mg IVPUSH Q6H PANCHO


   Stop: 03/10/21 02:31


   Last Admin: 03/10/21 03:50 Dose:  30 mg


   Documented by: 


Naloxone HCl (Naloxone 0.4 Mg/Ml Sdv)  0.1 mg IVPUSH SEECOMMENT PRN


   PRN Reason: Respiratory Depression


Ondansetron HCl (Ondansetron 4 Mg/2 Ml Sdv)  4 mg IVPUSH ONETIME PRN


   PRN Reason: Nausea/Vomiting


Oxycodone/Acetaminophen (Acetaminophen/Oxycodone 325-5 Mg Tab)  1 tab PO Q4H PRN


   PRN Reason: Pain (moderate 4-6)


   Last Admin: 03/10/21 15:53 Dose:  1 tab


   Documented by: 


Oxycodone/Acetaminophen (Acetaminophen/Oxycodone 325-5 Mg Tab)  2 tab PO Q4H PRN


   PRN Reason: Pain (severe 7-10)


   Last Admin: 21 03:30 Dose:  2 tab


   Documented by: 





Discontinued Medications





Bupivacaine HCl (Bupivacaine 0.5% 30 Ml Sdv) Confirm Administered Dose 30 ml 

.ROUTE .STK-MED ONE


   Stop: 21 07:21


   Last Admin: 21 07:56 Dose:  20 ml


   Documented by: 


Cefazolin Sodium (Cefazolin 1 Gm Vial) Confirm Administered Dose 2 gm .ROUTE 

.STK-MED ONE


   Stop: 21 06:45


Citric Acid/Sodium Citrate (Citric Acid/Sodium Citrate Solution 30 Ml Cup)  30 

ml PO ONETIME ONE


   Stop: 21 06:31


   Last Admin: 21 07:16 Dose:  30 ml


   Documented by: 


Diphtheria/Tetanus/Acell Pertussis (Diphtheria,Pertussis(Acell),Tetanus Vaccine 

0.5 Ml Syringe)  0.5 ml IM .ONCE ONE


   Stop: 21 06:35


   Last Admin: 03/10/21 17:21 Dose:  0.5 ml


   Documented by: 


Fentanyl (Fentanyl 100 Mcg/2 Ml Sdv) Confirm Administered Dose 100 mcg .ROUTE 

.STK-MED ONE


   Stop: 21 06:43


Cefazolin Sodium/Dextrose 2 gm (/ Premix)  50 mls @ 100 mls/hr IV ONETIME ONE


   Stop: 21 07:29


   Last Admin: 21 18:44 Dose:  Not Given


   Documented by: 


Lactated Ringer's (Ringers, Lactated)  1,000 mls @ 125 mls/hr IV ASDIRECTED PANCHO


   Last Admin: 21 07:22 Dose:  125 mls/hr


   Documented by: 


Ketorolac Tromethamine (Ketorolac 30 Mg/Ml Sdv) Confirm Administered Dose 30 mg 

.ROUTE .STK-MED ONE


   Stop: 21 08:24


Metoclopramide HCl (Metoclopramide 10 Mg/2 Ml Sdv)  10 mg IVPUSH ONETIME ONE


   Stop: 21 06:31


   Last Admin: 21 07:15 Dose:  10 mg


   Documented by: 


Miscellaneous Medication (Phenylephrine Hcl In 0.9% Nacl 1 Mg/10 Ml Syringe) 

Confirm Administered Dose 1 mg .ROUTE .STK-MED ONE


   Stop: 21 08:27


Morphine Sulfate (Morphine Pf 10 Mg/10 Ml Sdv) Confirm Administered Dose 10 mg 

.ROUTE .STK-MED ONE


   Stop: 21 06:43


Oxytocin (Oxytocin 10 Units/1 Ml Sdv) Confirm Administered Dose 20 unit .ROUTE 

.STK-MED ONE


   Stop: 21 06:47


Sodium Chloride (Sodium Chloride 0.9% 10 Ml Syringe)  10 ml FLUSH ASDIRECTED PRN


   PRN Reason: Keep Vein Open